# Patient Record
Sex: FEMALE
[De-identification: names, ages, dates, MRNs, and addresses within clinical notes are randomized per-mention and may not be internally consistent; named-entity substitution may affect disease eponyms.]

---

## 2019-01-08 PROBLEM — Z00.00 ENCOUNTER FOR PREVENTIVE HEALTH EXAMINATION: Status: ACTIVE | Noted: 2019-01-08

## 2019-04-09 ENCOUNTER — RECORD ABSTRACTING (OUTPATIENT)
Age: 84
End: 2019-04-09

## 2019-04-09 DIAGNOSIS — Z87.09 PERSONAL HISTORY OF OTHER DISEASES OF THE RESPIRATORY SYSTEM: ICD-10-CM

## 2019-04-09 RX ORDER — ASPIRIN ENTERIC COATED TABLETS 81 MG 81 MG/1
81 TABLET, DELAYED RELEASE ORAL
Refills: 0 | Status: ACTIVE | COMMUNITY

## 2019-04-09 RX ORDER — ALBUTEROL 90 MCG
AEROSOL (GRAM) INHALATION
Refills: 0 | Status: ACTIVE | COMMUNITY

## 2019-04-09 RX ORDER — ALBUTEROL SULFATE 5 MG/ML
SOLUTION, NON-ORAL INHALATION
Refills: 0 | Status: ACTIVE | COMMUNITY

## 2019-04-18 ENCOUNTER — APPOINTMENT (OUTPATIENT)
Dept: CARDIOLOGY | Facility: CLINIC | Age: 84
End: 2019-04-18

## 2019-05-29 ENCOUNTER — NON-APPOINTMENT (OUTPATIENT)
Age: 84
End: 2019-05-29

## 2019-05-29 ENCOUNTER — APPOINTMENT (OUTPATIENT)
Dept: CARDIOLOGY | Facility: CLINIC | Age: 84
End: 2019-05-29
Payer: MEDICARE

## 2019-05-29 VITALS
HEIGHT: 69 IN | HEART RATE: 71 BPM | WEIGHT: 187 LBS | RESPIRATION RATE: 14 BRPM | DIASTOLIC BLOOD PRESSURE: 83 MMHG | BODY MASS INDEX: 27.7 KG/M2 | SYSTOLIC BLOOD PRESSURE: 138 MMHG

## 2019-05-29 PROCEDURE — 99215 OFFICE O/P EST HI 40 MIN: CPT

## 2019-05-29 PROCEDURE — 93000 ELECTROCARDIOGRAM COMPLETE: CPT

## 2019-05-29 RX ORDER — VALSARTAN 160 MG/1
160 TABLET, COATED ORAL
Refills: 0 | Status: DISCONTINUED | COMMUNITY
End: 2019-05-29

## 2019-05-29 RX ORDER — UMECLIDINIUM 62.5 UG/1
62.5 AEROSOL, POWDER ORAL
Refills: 0 | Status: ACTIVE | COMMUNITY

## 2019-05-29 RX ORDER — HYDROXYZINE HCL 10 MG
10 TABLET ORAL
Refills: 0 | Status: ACTIVE | COMMUNITY

## 2019-05-29 RX ORDER — FLUTICASONE PROPIONATE 50 MCG
SPRAY, SUSPENSION NASAL
Refills: 0 | Status: ACTIVE | COMMUNITY

## 2019-05-29 RX ORDER — BUDESONIDE 0.5 MG/2ML
0.5 INHALANT ORAL
Refills: 0 | Status: ACTIVE | COMMUNITY

## 2019-05-29 RX ORDER — LOSARTAN POTASSIUM 50 MG/1
50 TABLET, FILM COATED ORAL
Refills: 0 | Status: ACTIVE | COMMUNITY

## 2019-05-29 RX ORDER — PREDNISONE 50 MG/1
TABLET ORAL
Refills: 0 | Status: ACTIVE | COMMUNITY

## 2019-05-29 NOTE — REVIEW OF SYSTEMS
[Feeling Fatigued] : feeling fatigued [Shortness Of Breath] : shortness of breath [Cough] : cough [Wheezing] : wheezing [Negative] : Heme/Lymph

## 2019-05-29 NOTE — PHYSICAL EXAM
[General Appearance - Well Developed] : well developed [Normal Conjunctiva] : the conjunctiva exhibited no abnormalities [Eyelids - No Xanthelasma] : the eyelids demonstrated no xanthelasmas [No Oral Cyanosis] : no oral cyanosis [Normal Oral Mucosa] : normal oral mucosa [No Oral Pallor] : no oral pallor [Normal Jugular Venous V Waves Present] : normal jugular venous V waves present [Normal Jugular Venous A Waves Present] : normal jugular venous A waves present [No Jugular Venous Brannon A Waves] : no jugular venous brannon A waves [Heart Rate And Rhythm] : heart rate and rhythm were normal [Heart Sounds] : normal S1 and S2 [Murmurs] : no murmurs present [Abdomen Tenderness] : non-tender [Abdomen Soft] : soft [Abdomen Mass (___ Cm)] : no abdominal mass palpated [] : no rash [FreeTextEntry1] : Multiple ecchymotic areas of the upper extremities [Affect] : the affect was normal [Oriented To Time, Place, And Person] : oriented to person, place, and time [Mood] : the mood was normal [No Anxiety] : not feeling anxious

## 2019-05-29 NOTE — HISTORY OF PRESENT ILLNESS
[FreeTextEntry1] : Today, she reports no symptoms of chest pain. She arrives in a wheelchair accompanied with her daughter, and 2 sons;\par \par There's been no chest pain, palpitations, or dizziness. She has had periods of shortness of breath and wheezing and has been using nebulizer treatments at home;\par \par During the hospital course she was tried on Entresto, however, she developed some blurred vision and did not feel well and it was discontinued;\par \par She has yet to follow up to pulmonary in the near future;

## 2019-05-29 NOTE — ASSESSMENT
[FreeTextEntry1] : EKG shows normal sinus rhythm at a rate of 71, left bundle branch pattern essentially unchanged;\par \par In summary the patient is an 87-year-old woman who is recuperating from recent pneumonia but still has residual evidence of bronchitis and superimposed She is also dependent on pulmonary meds and drug nebulizer treatment and using O2 nasal cannula 100% of time;\par \par Found to have evidence of cardiomyopathy with moderate LV dysfunction and mild signs of fluid overload suggested;\par \par \par Plan:\par \par Patient recommended to pursue a low sodium diet.;\par \par May need p.o. diuretic and will monitor edema and breathing-- although no audible rales heard;\par \par patient will require pulmonary followup and very near future\par \par Will recheck transthoracic echocardiogram by next visit within 3 months\par \par Continue current multiple cardiac medical regimen\par \par ;;;

## 2019-05-29 NOTE — REASON FOR VISIT
[Follow-Up - Clinic] : a clinic follow-up of [FreeTextEntry1] : The patient is an 87-year-old  woman with a known history for abnormal EKG (LBBB) and chronic asthma/COPD as well as small abdominal aortic aneurysm. She was lost to followup from approximately 2 years ago and was treated for hypertension and had been doing reasonably well from a cardiac standpoint.;\par \par A few weeks ago however she was admitted to [facility] with worsening dyspnea and found to have pneumonia;\par \par During the hospital course she was also found to have worsening LV systolic dysfunction of unknown etiology with a reduced systolic ejection fraction in the range of 30-35%.;\par \par She may have been treated for mild fluid overload and subsequently was released with still some persistent bronchitis and asthma symptoms are overall improved;\par

## 2019-06-11 ENCOUNTER — MOBILE ON CALL (OUTPATIENT)
Age: 84
End: 2019-06-11

## 2019-06-14 ENCOUNTER — APPOINTMENT (OUTPATIENT)
Dept: CARDIOLOGY | Facility: CLINIC | Age: 84
End: 2019-06-14

## 2019-08-02 ENCOUNTER — RX RENEWAL (OUTPATIENT)
Age: 84
End: 2019-08-02

## 2019-08-02 ENCOUNTER — MEDICATION RENEWAL (OUTPATIENT)
Age: 84
End: 2019-08-02

## 2019-08-02 RX ORDER — FUROSEMIDE 20 MG/1
20 TABLET ORAL
Qty: 90 | Refills: 1 | Status: ACTIVE | COMMUNITY
Start: 2019-06-11 | End: 1900-01-01

## 2019-08-23 ENCOUNTER — APPOINTMENT (OUTPATIENT)
Dept: CARDIOLOGY | Facility: CLINIC | Age: 84
End: 2019-08-23

## 2019-10-14 ENCOUNTER — APPOINTMENT (OUTPATIENT)
Dept: CARDIOLOGY | Facility: CLINIC | Age: 84
End: 2019-10-14

## 2020-01-28 ENCOUNTER — RX RENEWAL (OUTPATIENT)
Age: 85
End: 2020-01-28

## 2020-03-10 ENCOUNTER — APPOINTMENT (OUTPATIENT)
Dept: CARDIOLOGY | Facility: CLINIC | Age: 85
End: 2020-03-10
Payer: MEDICARE

## 2020-03-10 PROCEDURE — 93306 TTE W/DOPPLER COMPLETE: CPT

## 2020-03-17 ENCOUNTER — APPOINTMENT (OUTPATIENT)
Dept: ORTHOPEDIC SURGERY | Facility: CLINIC | Age: 85
End: 2020-03-17

## 2020-03-25 ENCOUNTER — APPOINTMENT (OUTPATIENT)
Dept: CARDIOLOGY | Facility: CLINIC | Age: 85
End: 2020-03-25

## 2020-07-23 ENCOUNTER — APPOINTMENT (OUTPATIENT)
Dept: CARDIOLOGY | Facility: CLINIC | Age: 85
End: 2020-07-23

## 2020-08-04 ENCOUNTER — APPOINTMENT (OUTPATIENT)
Dept: ORTHOPEDIC SURGERY | Facility: CLINIC | Age: 85
End: 2020-08-04

## 2020-10-13 ENCOUNTER — APPOINTMENT (OUTPATIENT)
Dept: CARDIOLOGY | Facility: CLINIC | Age: 85
End: 2020-10-13
Payer: MEDICARE

## 2020-10-13 ENCOUNTER — NON-APPOINTMENT (OUTPATIENT)
Age: 85
End: 2020-10-13

## 2020-10-13 VITALS
HEIGHT: 69 IN | TEMPERATURE: 97.3 F | RESPIRATION RATE: 12 BRPM | DIASTOLIC BLOOD PRESSURE: 80 MMHG | WEIGHT: 162 LBS | SYSTOLIC BLOOD PRESSURE: 130 MMHG | HEART RATE: 80 BPM | BODY MASS INDEX: 23.99 KG/M2

## 2020-10-13 DIAGNOSIS — I42.4 ENDOCARDIAL FIBROELASTOSIS: ICD-10-CM

## 2020-10-13 DIAGNOSIS — I42.9 CARDIOMYOPATHY, UNSPECIFIED: ICD-10-CM

## 2020-10-13 DIAGNOSIS — R94.31 ABNORMAL ELECTROCARDIOGRAM [ECG] [EKG]: ICD-10-CM

## 2020-10-13 DIAGNOSIS — I71.4 ABDOMINAL AORTIC ANEURYSM, W/OUT RUPTURE: ICD-10-CM

## 2020-10-13 DIAGNOSIS — I44.7 LEFT BUNDLE-BRANCH BLOCK, UNSPECIFIED: ICD-10-CM

## 2020-10-13 PROCEDURE — 99214 OFFICE O/P EST MOD 30 MIN: CPT

## 2020-10-13 PROCEDURE — 93000 ELECTROCARDIOGRAM COMPLETE: CPT

## 2020-10-13 RX ORDER — CARVEDILOL 3.12 MG/1
3.12 TABLET, FILM COATED ORAL
Qty: 180 | Refills: 1 | Status: ACTIVE | COMMUNITY

## 2020-10-13 NOTE — HISTORY OF PRESENT ILLNESS
[FreeTextEntry1] : During a hospital course back in 2019 she was tried on Entresto, however, she developed some blurred vision and did not feel well and it was discontinued;\par \par Otherwise, she reports compliance with taking previously prescribed cardiac meds daily.  She reports the Carvedilol was reduced from 12.5 mg BID to taking 3.125 mg BID during her last hospital course as well.\par \par Most recent Transthoracic Echocardiogram (March 2020):  Severely decreased global LV systolic function with an LVEF of 25 to 30%.  Mild to moderate hypokinesis of the apical septal segment and mid inferolateral segment respectively.  Mildly elevated pulmonary artery systolic pressure.  Moderate MR.  Mild TR and VT.

## 2020-10-13 NOTE — DISCUSSION/SUMMARY
[FreeTextEntry1] : 1).  Patient is to complete a Transthoracic Echocardiogram in the near future to assess LV systolic function, cardiomyopathy and valvulopathy.  \par \par 2).  She is tolerating cardiac medications without negative side effects, continue with current cardiac medication regimen including diuretic, beta blocker, ARB, ASA.\par \par 3).  Follow up with PCP (Dr. Dover) regarding routine checkups and blood work including renal function and electrolytes, have copy faxed to our office. \par \par 4).  Counseled patient on eating a well balanced diet low in sodium, fats and carbohydrates.  She is to attempt a modest exercise regimen walking in her home few times per week.  Keep well PO hydrated.\par \par 5).  Recommend patient report any untoward symptoms. \par \par 6).  Follow up with Dr. Rosario in 3 to 4 months or PRN.

## 2020-10-13 NOTE — ASSESSMENT
[FreeTextEntry1] : EKG 10/13/2020:  The EKG illustrates sinus rhythm, rate of 80 bpm, frequent ectopic ventricular beats, nonspecific QRS widening and right axis, nonspecific ST depression and negative T waves.\par \par

## 2020-10-13 NOTE — PHYSICAL EXAM
[Normal Appearance] : normal appearance [General Appearance - Well Nourished] : well nourished [General Appearance - In No Acute Distress] : no acute distress [Normal Conjunctiva] : the conjunctiva exhibited no abnormalities [Normal Oral Mucosa] : normal oral mucosa [Normal Oropharynx] : normal oropharynx [Normal Jugular Venous A Waves Present] : normal jugular venous A waves present [Normal Jugular Venous V Waves Present] : normal jugular venous V waves present [No Jugular Venous Brannon A Waves] : no jugular venous brannon A waves [] : no respiratory distress [Auscultation Breath Sounds / Voice Sounds] : lungs were clear to auscultation bilaterally [Heart Rate And Rhythm] : heart rate and rhythm were normal [Heart Sounds] : normal S1 and S2 [Murmurs] : no murmurs present [Edema] : no peripheral edema present [Bowel Sounds] : normal bowel sounds [Nail Clubbing] : no clubbing of the fingernails [Skin Color & Pigmentation] : normal skin color and pigmentation [Oriented To Time, Place, And Person] : oriented to person, place, and time [Impaired Insight] : insight and judgment were intact [Affect] : the affect was normal [FreeTextEntry1] : sitting in wheelchair

## 2020-10-13 NOTE — REASON FOR VISIT
[FreeTextEntry1] : WINNIE BARRAZA is being seen for a clinic follow-up of. \par \par The patient is an 89-year-old  woman with a known history for abnormal EKG (LBBB) and chronic asthma/COPD, hypertension as well as small abdominal aortic aneurysm.\par \par Mrs. Barraza continues to experience intermittent shortness of breath unchanged from her baseline.  She still takes supplemental O2 via nasal cannula at 2 liters.\par \par She reports doing reasonably well from a cardiac standpoint. Patient denies CP, orthopnea, palpitations, presyncope, syncope, edema.

## 2021-02-18 ENCOUNTER — APPOINTMENT (OUTPATIENT)
Dept: CARDIOLOGY | Facility: CLINIC | Age: 86
End: 2021-02-18

## 2021-05-05 ENCOUNTER — APPOINTMENT (OUTPATIENT)
Dept: CARDIOLOGY | Facility: CLINIC | Age: 86
End: 2021-05-05

## 2021-06-29 ENCOUNTER — APPOINTMENT (OUTPATIENT)
Dept: CARDIOLOGY | Facility: CLINIC | Age: 86
End: 2021-06-29

## 2021-07-01 ENCOUNTER — APPOINTMENT (OUTPATIENT)
Dept: CARDIOLOGY | Facility: CLINIC | Age: 86
End: 2021-07-01

## 2022-03-08 ENCOUNTER — NON-APPOINTMENT (OUTPATIENT)
Age: 87
End: 2022-03-08

## 2022-03-10 ENCOUNTER — APPOINTMENT (OUTPATIENT)
Dept: CARDIOLOGY | Facility: CLINIC | Age: 87
End: 2022-03-10